# Patient Record
Sex: FEMALE | NOT HISPANIC OR LATINO | URBAN - METROPOLITAN AREA
[De-identification: names, ages, dates, MRNs, and addresses within clinical notes are randomized per-mention and may not be internally consistent; named-entity substitution may affect disease eponyms.]

---

## 2020-07-07 ENCOUNTER — OFFICE VISIT (OUTPATIENT)
Dept: URBAN - METROPOLITAN AREA CLINIC 113 | Facility: CLINIC | Age: 65
End: 2020-07-07

## 2020-07-25 ENCOUNTER — TELEPHONE ENCOUNTER (OUTPATIENT)
Dept: URBAN - METROPOLITAN AREA CLINIC 13 | Facility: CLINIC | Age: 65
End: 2020-07-25

## 2020-07-26 ENCOUNTER — TELEPHONE ENCOUNTER (OUTPATIENT)
Dept: URBAN - METROPOLITAN AREA CLINIC 13 | Facility: CLINIC | Age: 65
End: 2020-07-26

## 2020-07-26 RX ORDER — BETAMETHASONE VALERATE 1 MG/G
APP EXT AA BID FOR 14 DAYS CREAM TOPICAL
Qty: 30 | Refills: 0 | Status: ACTIVE | COMMUNITY
Start: 2019-08-07

## 2020-07-26 RX ORDER — PREDNISOLONE ACETATE 10 MG/ML
SUSPENSION/ DROPS OPHTHALMIC
Qty: 5 | Refills: 0 | Status: ACTIVE | COMMUNITY
Start: 2019-01-31

## 2020-07-26 RX ORDER — LEVOTHYROXINE SODIUM 0.1 MG/1
TAKE 1 TABLET BY MOUTH ONCE DAILY IN THE MORNING ON AN EMPTY STOMACH TABLET ORAL
Qty: 90 | Refills: 0 | Status: ACTIVE | COMMUNITY
Start: 2019-05-16

## 2020-07-26 RX ORDER — SODIUM FLUORIDE 5 MG/G
GEL, DENTIFRICE DENTAL
Qty: 56 | Refills: 0 | Status: ACTIVE | COMMUNITY
Start: 2019-09-04

## 2020-07-26 RX ORDER — CLINDAMYCIN HYDROCHLORIDE 150 MG/1
TAKE 1 CAPSULE EVERY 6 HOURS UNTIL GONE CAPSULE ORAL
Qty: 30 | Refills: 0 | Status: ACTIVE | COMMUNITY
Start: 2019-05-30

## 2020-07-26 RX ORDER — CLOTRIMAZOLE AND BETAMETHASONE DIPROPIONATE 10; .5 MG/G; MG/G
CREAM TOPICAL
Qty: 30 | Refills: 0 | Status: ACTIVE | COMMUNITY
Start: 2020-04-20

## 2020-07-26 RX ORDER — URSODIOL 250 MG/1
TABLET ORAL
Qty: 120 | Refills: 0 | Status: ACTIVE | COMMUNITY
Start: 2018-11-16

## 2020-07-26 RX ORDER — DUREZOL 0.5 MG/ML
EMULSION OPHTHALMIC
Qty: 5 | Refills: 0 | Status: ACTIVE | COMMUNITY
Start: 2019-04-04

## 2020-07-26 RX ORDER — SODIUM FLUORIDE 5 MG/G
BRUSH FOR 30 SECONDS SWISH FOR 30 SECONDS AND SPIT DO NOT RINSE GEL, DENTIFRICE DENTAL
Qty: 56 | Refills: 0 | Status: ACTIVE | COMMUNITY
Start: 2019-09-09

## 2020-07-26 RX ORDER — TRAMADOL HYDROCHLORIDE 50 MG/1
TK 1 T PO ONCE D PRN TABLET ORAL
Qty: 90 | Refills: 0 | Status: ACTIVE | COMMUNITY
Start: 2018-12-11

## 2020-07-26 RX ORDER — URSODIOL 500 MG/1
TAKE 1 TABLET TWICE DAILY TABLET ORAL
Qty: 180 | Refills: 3 | Status: ACTIVE | COMMUNITY
Start: 2019-04-17

## 2020-07-26 RX ORDER — SIMVASTATIN 10 MG/1
TAKE 1 TABLET DAILY TABLET, FILM COATED ORAL
Refills: 0 | Status: ACTIVE | COMMUNITY
Start: 2019-08-07

## 2020-07-26 RX ORDER — CLOTRIMAZOLE AND BETAMETHASONE DIPROPIONATE 10; .5 MG/G; MG/G
CREAM TOPICAL
Qty: 30 | Refills: 0 | Status: ACTIVE | COMMUNITY
Start: 2020-02-11

## 2020-07-26 RX ORDER — SULFAMETHOXAZOLE AND TRIMETHOPRIM 800; 160 MG/1; MG/1
TABLET ORAL
Qty: 14 | Refills: 0 | Status: ACTIVE | COMMUNITY
Start: 2020-02-07

## 2020-07-26 RX ORDER — MELOXICAM 15 MG/1
TAKE 1 TABLET DAILY TABLET ORAL
Refills: 0 | Status: ACTIVE | COMMUNITY
Start: 2019-08-07

## 2020-08-04 ENCOUNTER — WEB ENCOUNTER (OUTPATIENT)
Dept: URBAN - METROPOLITAN AREA CLINIC 113 | Facility: CLINIC | Age: 65
End: 2020-08-04

## 2020-08-28 ENCOUNTER — TELEPHONE ENCOUNTER (OUTPATIENT)
Dept: URBAN - METROPOLITAN AREA CLINIC 113 | Facility: CLINIC | Age: 65
End: 2020-08-28

## 2020-08-28 RX ORDER — HEPATITIS A AND HEPATITIS B (RECOMBINANT) VACCINE 720; 20 [IU]/ML; UG/ML
INJECT INJECTION, SUSPENSION INTRAMUSCULAR
Qty: 3 | Refills: 0 | OUTPATIENT
Start: 2020-08-28 | End: 2020-08-31

## 2020-09-24 ENCOUNTER — ERX REFILL RESPONSE (OUTPATIENT)
Dept: URBAN - METROPOLITAN AREA CLINIC 113 | Facility: CLINIC | Age: 65
End: 2020-09-24

## 2020-09-24 ENCOUNTER — TELEPHONE ENCOUNTER (OUTPATIENT)
Dept: URBAN - METROPOLITAN AREA CLINIC 113 | Facility: CLINIC | Age: 65
End: 2020-09-24

## 2020-09-24 RX ORDER — HEPATITIS A AND HEPATITIS B (RECOMBINANT) VACCINE 720; 20 [IU]/ML; UG/ML
AS DIRECTED INJECTION, SUSPENSION INTRAMUSCULAR
Qty: 3 | Refills: 0 | OUTPATIENT

## 2020-09-24 RX ORDER — HEPATITIS A AND HEPATITIS B (RECOMBINANT) VACCINE 720; 20 [IU]/ML; UG/ML
AS DIRECTED INJECTION, SUSPENSION INTRAMUSCULAR
Qty: 3 | Refills: 0 | OUTPATIENT
Start: 2020-09-24 | End: 2020-09-27

## 2020-09-24 RX ORDER — HEPATITIS A AND HEPATITIS B (RECOMBINANT) VACCINE 720; 20 [IU]/ML; UG/ML
PHARMACIST ADMINISTERED IMMUNIZATION, ADMINISTERED AT TIME OF DISPENSING AS  DIRECTED  NOW,  THEN  IN  1  MONTH,  THEN  IN  6  MONTHS INJECTION, SUSPENSION INTRAMUSCULAR
Qty: 3 EACH | Refills: 0 | OUTPATIENT

## 2020-10-21 ENCOUNTER — TELEPHONE ENCOUNTER (OUTPATIENT)
Dept: URBAN - METROPOLITAN AREA CLINIC 113 | Facility: CLINIC | Age: 65
End: 2020-10-21

## 2020-10-28 ENCOUNTER — OFFICE VISIT (OUTPATIENT)
Dept: URBAN - METROPOLITAN AREA CLINIC 113 | Facility: CLINIC | Age: 65
End: 2020-10-28

## 2020-11-09 ENCOUNTER — TELEPHONE ENCOUNTER (OUTPATIENT)
Dept: URBAN - METROPOLITAN AREA CLINIC 113 | Facility: CLINIC | Age: 65
End: 2020-11-09

## 2020-11-30 ENCOUNTER — OFFICE VISIT (OUTPATIENT)
Dept: URBAN - METROPOLITAN AREA CLINIC 113 | Facility: CLINIC | Age: 65
End: 2020-11-30
Payer: MEDICARE

## 2020-11-30 ENCOUNTER — WEB ENCOUNTER (OUTPATIENT)
Dept: URBAN - METROPOLITAN AREA CLINIC 113 | Facility: CLINIC | Age: 65
End: 2020-11-30

## 2020-11-30 VITALS
WEIGHT: 159 LBS | SYSTOLIC BLOOD PRESSURE: 157 MMHG | RESPIRATION RATE: 18 BRPM | HEIGHT: 63 IN | BODY MASS INDEX: 28.17 KG/M2 | HEART RATE: 79 BPM | TEMPERATURE: 98.4 F | DIASTOLIC BLOOD PRESSURE: 76 MMHG

## 2020-11-30 DIAGNOSIS — K74.3 PRIMARY BILIARY CHOLANGITIS: ICD-10-CM

## 2020-11-30 DIAGNOSIS — K76.0 NAFLD (NONALCOHOLIC FATTY LIVER DISEASE): ICD-10-CM

## 2020-11-30 DIAGNOSIS — K75.4 AUTOIMMUNE HEPATITIS: ICD-10-CM

## 2020-11-30 PROBLEM — 408335007: Status: ACTIVE | Noted: 2020-11-30

## 2020-11-30 PROCEDURE — G8427 DOCREV CUR MEDS BY ELIG CLIN: HCPCS | Performed by: INTERNAL MEDICINE

## 2020-11-30 PROCEDURE — G8417 CALC BMI ABV UP PARAM F/U: HCPCS | Performed by: INTERNAL MEDICINE

## 2020-11-30 PROCEDURE — 76700 US EXAM ABDOM COMPLETE: CPT | Performed by: INTERNAL MEDICINE

## 2020-11-30 PROCEDURE — 3017F COLORECTAL CA SCREEN DOC REV: CPT | Performed by: INTERNAL MEDICINE

## 2020-11-30 PROCEDURE — G9903 PT SCRN TBCO ID AS NON USER: HCPCS | Performed by: INTERNAL MEDICINE

## 2020-11-30 PROCEDURE — 99214 OFFICE O/P EST MOD 30 MIN: CPT | Performed by: INTERNAL MEDICINE

## 2020-11-30 RX ORDER — HEPATITIS A AND HEPATITIS B (RECOMBINANT) VACCINE 720; 20 [IU]/ML; UG/ML
PHARMACIST ADMINISTERED IMMUNIZATION, ADMINISTERED AT TIME OF DISPENSING AS  DIRECTED  NOW,  THEN  IN  1  MONTH,  THEN  IN  6  MONTHS INJECTION, SUSPENSION INTRAMUSCULAR
Qty: 3 EACH | Refills: 0 | Status: DISCONTINUED | COMMUNITY

## 2020-11-30 RX ORDER — SODIUM FLUORIDE 5 MG/G
GEL, DENTIFRICE DENTAL
Qty: 56 | Refills: 0 | Status: DISCONTINUED | COMMUNITY
Start: 2019-09-04

## 2020-11-30 RX ORDER — DUREZOL 0.5 MG/ML
EMULSION OPHTHALMIC
Qty: 5 | Refills: 0 | Status: DISCONTINUED | COMMUNITY
Start: 2019-04-04

## 2020-11-30 RX ORDER — BETAMETHASONE VALERATE 1 MG/G
APP EXT AA BID FOR 14 DAYS CREAM TOPICAL
Qty: 30 | Refills: 0 | Status: DISCONTINUED | COMMUNITY
Start: 2019-08-07

## 2020-11-30 RX ORDER — MELOXICAM 15 MG/1
TAKE 1 TABLET DAILY TABLET ORAL
Refills: 0 | Status: ACTIVE | COMMUNITY
Start: 2019-08-07

## 2020-11-30 RX ORDER — SULFAMETHOXAZOLE AND TRIMETHOPRIM 800; 160 MG/1; MG/1
TABLET ORAL
Qty: 14 | Refills: 0 | Status: DISCONTINUED | COMMUNITY
Start: 2020-02-07

## 2020-11-30 RX ORDER — URSODIOL 500 MG/1
TAKE 1 TABLET TWICE DAILY TABLET ORAL
Qty: 180 | Refills: 3 | Status: ACTIVE | COMMUNITY
Start: 2019-04-17

## 2020-11-30 RX ORDER — CLINDAMYCIN HYDROCHLORIDE 150 MG/1
TAKE 1 CAPSULE EVERY 6 HOURS UNTIL GONE CAPSULE ORAL
Qty: 30 | Refills: 0 | Status: DISCONTINUED | COMMUNITY
Start: 2019-05-30

## 2020-11-30 RX ORDER — TRAMADOL HYDROCHLORIDE 50 MG/1
TK 1 T PO ONCE D PRN TABLET ORAL
Qty: 90 | Refills: 0 | Status: DISCONTINUED | COMMUNITY
Start: 2018-12-11

## 2020-11-30 RX ORDER — PREDNISOLONE ACETATE 10 MG/ML
SUSPENSION/ DROPS OPHTHALMIC
Qty: 5 | Refills: 0 | Status: DISCONTINUED | COMMUNITY
Start: 2019-01-31

## 2020-11-30 RX ORDER — CLOTRIMAZOLE AND BETAMETHASONE DIPROPIONATE 10; .5 MG/G; MG/G
CREAM TOPICAL
Qty: 30 | Refills: 0 | Status: DISCONTINUED | COMMUNITY
Start: 2020-02-11

## 2020-11-30 RX ORDER — SIMVASTATIN 10 MG/1
TAKE 1 TABLET DAILY TABLET, FILM COATED ORAL
Refills: 0 | Status: ACTIVE | COMMUNITY
Start: 2019-08-07

## 2020-11-30 RX ORDER — LEVOTHYROXINE SODIUM 0.1 MG/1
TAKE 1 TABLET BY MOUTH ONCE DAILY IN THE MORNING ON AN EMPTY STOMACH TABLET ORAL
Qty: 90 | Refills: 0 | Status: ACTIVE | COMMUNITY
Start: 2019-05-16

## 2020-11-30 RX ORDER — URSODIOL 250 MG/1
1 TABLET TABLET ORAL ONCE A DAY
Refills: 0 | Status: DISCONTINUED | COMMUNITY
Start: 2018-11-16

## 2020-11-30 NOTE — HPI-TODAY'S VISIT:
Ms. DIAS is a 64 year old female with a history of lung cancer, hypothyroidism, and sleep apnea initially referred for evaluation of elevated liver enzymes.  She was last seen July 7th.  She is on Kar bid.   Labs are below from October.  She was diagnosed with PBC and NAFLD in the past.  She is no longer working so her activity has decreased.  Her weight has increased by about 10lbs.  She tries to walk for about 20 minutes a few times a week.  No alcohol use.  She denies any new medications.  She does use Meloxicam daily for arthritis. She did get the Hep A and B vaccine this year.  She denies any new GI complaints including abdominal pain, nausea, vomiting, jaundice, pruritus, fatigue or weight loss. Previous liver biopsy August 2018 did reveal chronic hepatitis grade 2 stage I, mild steatosis, no cholestasis, no iron storage. AMA was negative and ASMA was mildly positive.  She is compliant with Kar 500 mg twice daily. She has had two previously normal colonoscopies, in 2005 and 2015.   Labs 12/5/19 : AST 38, ALT 39, , T bili 0.3. Negative hepatitis A antibody IgM, hepatitis B surface antigen, hepatitis B core antibody IgM, hepatitis C antibody. Labs 10/26/2020 : BUN 11, creatinine 0.8, AST 37, ALT 40, alkaline phosphatase 133, T bili 0.3, albumin 4.7; WBC 7.1, hemoglobin 13.6, , platelets 453

## 2021-01-21 ENCOUNTER — LAB OUTSIDE AN ENCOUNTER (OUTPATIENT)
Dept: URBAN - METROPOLITAN AREA CLINIC 113 | Facility: CLINIC | Age: 66
End: 2021-01-21

## 2021-09-01 ENCOUNTER — ERX REFILL RESPONSE (OUTPATIENT)
Dept: URBAN - METROPOLITAN AREA CLINIC 113 | Facility: CLINIC | Age: 66
End: 2021-09-01

## 2021-09-01 RX ORDER — URSODIOL 500 MG/1
TAKE ONE TABLET BY MOUTH TWICE A DAY TABLET ORAL
Qty: 180 TABLET | Refills: 1 | OUTPATIENT

## 2021-09-01 RX ORDER — URSODIOL 500 MG/1
TAKE 1 TABLET TWICE DAILY TABLET ORAL
Qty: 180 | Refills: 3 | OUTPATIENT

## 2021-11-03 ENCOUNTER — WEB ENCOUNTER (OUTPATIENT)
Dept: URBAN - METROPOLITAN AREA CLINIC 113 | Facility: CLINIC | Age: 66
End: 2021-11-03

## 2021-11-10 LAB
A/G RATIO: 1.2
ALBUMIN: 4.6
ALKALINE PHOSPHATASE: 146
ALT (SGPT): 31
AST (SGOT): 32
BASO (ABSOLUTE): 0
BASOS: 1
BILIRUBIN, TOTAL: 0.5
BUN/CREATININE RATIO: 10
BUN: 6
CALCIUM: 10.3
CARBON DIOXIDE, TOTAL: 29
CHLORIDE: 102
CREATININE: 0.61
EGFR IF AFRICN AM: 108
EGFR IF NONAFRICN AM: 94
EOS (ABSOLUTE): 0.1
EOS: 2
GLOBULIN, TOTAL: 3.7
GLUCOSE: 96
HEMATOCRIT: 41.6
HEMATOLOGY COMMENTS:: (no result)
HEMOGLOBIN: 13.6
IMMATURE CELLS: (no result)
IMMATURE GRANS (ABS): 0
IMMATURE GRANULOCYTES: 0
LYMPHS (ABSOLUTE): 1.9
LYMPHS: 29
MCH: 31.3
MCHC: 32.7
MCV: 96
MONOCYTES(ABSOLUTE): 0.8
MONOCYTES: 12
NEUTROPHILS (ABSOLUTE): 3.7
NEUTROPHILS: 56
NRBC: (no result)
PLATELETS: 495
POTASSIUM: 5.3
PROTEIN, TOTAL: 8.3
RBC: 4.34
RDW: 12
SODIUM: 143
WBC: 6.5

## 2021-12-01 ENCOUNTER — OFFICE VISIT (OUTPATIENT)
Dept: URBAN - METROPOLITAN AREA CLINIC 113 | Facility: CLINIC | Age: 66
End: 2021-12-01
Payer: MEDICARE

## 2021-12-01 VITALS
RESPIRATION RATE: 20 BRPM | DIASTOLIC BLOOD PRESSURE: 78 MMHG | BODY MASS INDEX: 26.4 KG/M2 | SYSTOLIC BLOOD PRESSURE: 132 MMHG | HEART RATE: 77 BPM | HEIGHT: 63 IN | TEMPERATURE: 97.3 F | WEIGHT: 149 LBS

## 2021-12-01 DIAGNOSIS — K76.0 NAFLD (NONALCOHOLIC FATTY LIVER DISEASE): ICD-10-CM

## 2021-12-01 DIAGNOSIS — K74.3 PRIMARY BILIARY CHOLANGITIS: ICD-10-CM

## 2021-12-01 PROCEDURE — 99213 OFFICE O/P EST LOW 20 MIN: CPT | Performed by: INTERNAL MEDICINE

## 2021-12-01 RX ORDER — LEVOTHYROXINE SODIUM 0.11 MG/1
TAKE 1 TABLET BY MOUTH ONCE DAILY IN THE MORNING ON AN EMPTY STOMACH TABLET ORAL ONCE A DAY
Refills: 0 | Status: ACTIVE | COMMUNITY
Start: 2019-05-16

## 2021-12-01 RX ORDER — URSODIOL 500 MG/1
TAKE ONE TABLET BY MOUTH TWICE A DAY TABLET ORAL
Qty: 180 TABLET | Refills: 1 | Status: ACTIVE | COMMUNITY

## 2021-12-01 RX ORDER — MELOXICAM 15 MG/1
TAKE 1 TABLET DAILY TABLET ORAL
Refills: 0 | Status: ON HOLD | COMMUNITY
Start: 2019-08-07

## 2021-12-01 RX ORDER — SIMVASTATIN 10 MG/1
TAKE 1 TABLET DAILY TABLET, FILM COATED ORAL
Refills: 0 | Status: ACTIVE | COMMUNITY
Start: 2019-08-07

## 2021-12-01 NOTE — HPI-TODAY'S VISIT:
Ms. DIAS is a 66year old female with a history of lung cancer, hypothyroidism, and sleep apnea presenting for follow up of elevated liver enzymes.  She was diagnosed with PBC and NAFLD in the past. She was last seen 11/2020 with slight increase in liver enzymes. Repeat labs and RUQ Ultrasound with elastography were recommended. US 12/2020 noted mild adenomyomatosis/cholesterolosis of the gallbladder, no cholecystitis or cholelithiasis. Heterogeneous increased echogenicity throughout the liver parenchyma consistent with chronic liver disease and/or steatosis. No focal hepatic lesion, no fibrosis and shear wave velocity 0.82. Labs 12/2020 noted positive LIDIA 1:2560. Negative AMA.  ASMA was 24.6, which was equivocal.  AST 37, ALT 41, , TB 0.4, Alb 4.3. Recent labs 9/2021 noted AST 37 (mildly elevated), ALT 32,  (elevated), TB 0.4, Alb 4.5.  She is doing well without any GI complaints. She continues on Ursodiol 500mg twice daily. She denies any abdominal pain, nausea, vomiting, change in bowel habits, melena or hematochezia.   Previous liver biopsy August 2018 did reveal chronic hepatitis grade 2 stage I, mild steatosis, no cholestasis, no iron storage. findings may be consistent with early PBC, chronic viral hepatitis or drug-induced liver injury.   AMA was negative and ASMA was mildly positive.      Labs 12/5/19 : AST 38, ALT 39, , T bili 0.3. Negative hepatitis A antibody IgM, hepatitis B surface antigen, hepatitis B core antibody IgM, hepatitis C antibody. Labs 10/26/2020 : BUN 11, creatinine 0.8, AST 37, ALT 40, alkaline phosphatase 133, T bili 0.3, albumin 4.7; WBC 7.1, hemoglobin 13.6, , platelets 453   She has had two previously normal colonoscopies, in 2005 and 2015.

## 2021-12-15 ENCOUNTER — ERX REFILL RESPONSE (OUTPATIENT)
Dept: URBAN - METROPOLITAN AREA CLINIC 107 | Facility: CLINIC | Age: 66
End: 2021-12-15

## 2021-12-15 ENCOUNTER — TELEPHONE ENCOUNTER (OUTPATIENT)
Dept: URBAN - METROPOLITAN AREA CLINIC 23 | Facility: CLINIC | Age: 66
End: 2021-12-15

## 2021-12-15 RX ORDER — URSODIOL 500 MG/1
TAKE ONE TABLET BY MOUTH TWICE A DAY TABLET ORAL
Qty: 180 TABLET | Refills: 5 | OUTPATIENT

## 2021-12-15 RX ORDER — URSODIOL 500 MG/1
TAKE ONE TABLET BY MOUTH TWICE A DAY TABLET ORAL
Qty: 180 TABLET | Refills: 1 | OUTPATIENT

## 2021-12-15 RX ORDER — URSODIOL 500 MG/1
1 TABLET TABLET ORAL TWICE A DAY
Qty: 180 TABLET | Refills: 3 | OUTPATIENT

## 2022-06-13 ENCOUNTER — OFFICE VISIT (OUTPATIENT)
Dept: URBAN - METROPOLITAN AREA CLINIC 113 | Facility: CLINIC | Age: 67
End: 2022-06-13
Payer: MEDICARE

## 2022-06-13 VITALS
TEMPERATURE: 98.6 F | BODY MASS INDEX: 27.29 KG/M2 | SYSTOLIC BLOOD PRESSURE: 134 MMHG | HEIGHT: 63 IN | HEART RATE: 75 BPM | WEIGHT: 154 LBS | DIASTOLIC BLOOD PRESSURE: 75 MMHG

## 2022-06-13 DIAGNOSIS — K74.3 PRIMARY BILIARY CHOLANGITIS: ICD-10-CM

## 2022-06-13 DIAGNOSIS — K76.0 NAFLD (NONALCOHOLIC FATTY LIVER DISEASE): ICD-10-CM

## 2022-06-13 PROBLEM — 197315008: Status: ACTIVE | Noted: 2020-11-30

## 2022-06-13 PROBLEM — 31712002: Status: ACTIVE | Noted: 2020-10-21

## 2022-06-13 PROCEDURE — 99213 OFFICE O/P EST LOW 20 MIN: CPT | Performed by: INTERNAL MEDICINE

## 2022-06-13 RX ORDER — URSODIOL 500 MG/1
TAKE ONE TABLET BY MOUTH TWICE A DAY TABLET ORAL
Qty: 180 TABLET | Refills: 5 | Status: ACTIVE | COMMUNITY

## 2022-06-13 RX ORDER — URSODIOL 500 MG/1
TAKE ONE TABLET BY MOUTH TWICE A DAY TABLET ORAL
OUTPATIENT

## 2022-06-13 RX ORDER — HYDROCHLOROTHIAZIDE 25 MG/1
1 TABLET IN THE MORNING TABLET ORAL ONCE A DAY
Status: ACTIVE | COMMUNITY

## 2022-06-13 RX ORDER — SIMVASTATIN 10 MG/1
TAKE 1 TABLET DAILY TABLET, FILM COATED ORAL
Refills: 0 | Status: ACTIVE | COMMUNITY
Start: 2019-08-07

## 2022-06-13 RX ORDER — LEVOTHYROXINE SODIUM 0.11 MG/1
TAKE 1 TABLET BY MOUTH ONCE DAILY IN THE MORNING ON AN EMPTY STOMACH TABLET ORAL ONCE A DAY
Refills: 0 | Status: ACTIVE | COMMUNITY
Start: 2019-05-16

## 2022-06-13 NOTE — HPI-TODAY'S VISIT:
Ms. DIAS is a 66 year old female with a history of lung cancer, hyperlipidemia, hypothyroidism, and sleep apnea presenting for follow up of elevated liver enzymes and PBC.  She was diagnosed with PBC and NAFLD in the past.   US 12/2020 noted mild adenomyomatosis/cholesterolosis of the gallbladder, no cholecystitis or cholelithiasis. Heterogeneous increased echogenicity throughout the liver parenchyma consistent with chronic liver disease and/or steatosis. No focal hepatic lesion, no fibrosis and shear wave velocity 0.82. She is doing well without any GI complaints. She continues on Ursodiol 500mg twice daily. She denies any abdominal pain, nausea, vomiting, change in bowel habits, melena or hematochezia.   Previous liver biopsy August 2018 did reveal chronic hepatitis grade 2 stage I, mild steatosis, no cholestasis, no iron storage. findings may be consistent with early PBC, chronic viral hepatitis or drug-induced liver injury.  AMA was negative and ASMA was mildly positive.      Labs  3/17/2022 BUN 8, creatinine 0.6, AST 36, ALT 37, alk phos 133, T bili 0.7, albumin 4.6; WBC 6.9, hemoglobin 12.8, MCV 99, platelets 455 12/2020 noted positive LIDIA 1:2560. Negative AMA.  ASMA was 24.6, which was equivocal.  AST 37, ALT 41, , TB 0.4, Alb 4.3 12/5/19 : AST 38, ALT 39, , T bili 0.3. Negative hepatitis A antibody IgM, hepatitis B surface antigen, hepatitis B core antibody IgM, hepatitis C antibody.  She has had two previously normal colonoscopies, in 2005 and 2015.

## 2023-01-12 ENCOUNTER — ERX REFILL RESPONSE (OUTPATIENT)
Dept: URBAN - METROPOLITAN AREA CLINIC 107 | Facility: CLINIC | Age: 68
End: 2023-01-12

## 2023-01-12 RX ORDER — URSODIOL 500 MG/1
TAKE ONE TABLET BY MOUTH TWICE A DAY TABLET ORAL
Qty: 180 TABLET | Refills: 6 | OUTPATIENT

## 2023-01-12 RX ORDER — URSODIOL 500 MG/1
TAKE ONE TABLET BY MOUTH TWICE A DAY TABLET ORAL
OUTPATIENT

## 2023-07-24 ENCOUNTER — DASHBOARD ENCOUNTERS (OUTPATIENT)
Age: 68
End: 2023-07-24

## 2023-07-24 ENCOUNTER — OFFICE VISIT (OUTPATIENT)
Dept: URBAN - METROPOLITAN AREA CLINIC 113 | Facility: CLINIC | Age: 68
End: 2023-07-24
Payer: MEDICARE

## 2023-07-24 VITALS
WEIGHT: 150.8 LBS | DIASTOLIC BLOOD PRESSURE: 71 MMHG | TEMPERATURE: 97.7 F | RESPIRATION RATE: 18 BRPM | HEIGHT: 63 IN | HEART RATE: 73 BPM | BODY MASS INDEX: 26.72 KG/M2 | SYSTOLIC BLOOD PRESSURE: 138 MMHG

## 2023-07-24 DIAGNOSIS — K74.3 PRIMARY BILIARY CHOLANGITIS: ICD-10-CM

## 2023-07-24 DIAGNOSIS — K76.0 NAFLD (NONALCOHOLIC FATTY LIVER DISEASE): ICD-10-CM

## 2023-07-24 PROCEDURE — 99213 OFFICE O/P EST LOW 20 MIN: CPT | Performed by: INTERNAL MEDICINE

## 2023-07-24 RX ORDER — LEVOTHYROXINE SODIUM 0.11 MG/1
TAKE 1 TABLET BY MOUTH ONCE DAILY IN THE MORNING ON AN EMPTY STOMACH TABLET ORAL ONCE A DAY
Refills: 0 | Status: ACTIVE | COMMUNITY
Start: 2019-05-16

## 2023-07-24 RX ORDER — URSODIOL 500 MG/1
TAKE ONE TABLET BY MOUTH TWICE A DAY TABLET ORAL
Qty: 180 TABLET | Refills: 6 | Status: ACTIVE | COMMUNITY

## 2023-07-24 RX ORDER — SIMVASTATIN 10 MG/1
TAKE 1 TABLET DAILY TABLET, FILM COATED ORAL
Refills: 0 | Status: ACTIVE | COMMUNITY
Start: 2019-08-07

## 2023-07-24 RX ORDER — URSODIOL 500 MG/1
TAKE ONE TABLET BY MOUTH TWICE A DAY TABLET ORAL
OUTPATIENT

## 2023-07-24 RX ORDER — HYDROCHLOROTHIAZIDE 12.5 MG/1
1 CAPSULE IN THE MORNING CAPSULE, GELATIN COATED ORAL ONCE A DAY
Status: ACTIVE | COMMUNITY

## 2023-07-24 NOTE — HPI-TODAY'S VISIT:
Ms. DIAS is a 67 year old female with a history of lung cancer, hyperlipidemia, hypothyroidism, and sleep apnea presenting for follow up of elevated liver enzymes and PBC.  She was diagnosed with PBC and NAFLD in the past.   US 12/2020 noted mild adenomyomatosis/cholesterolosis of the gallbladder, no cholecystitis or cholelithiasis. Heterogeneous increased echogenicity throughout the liver parenchyma consistent with chronic liver disease and/or steatosis. No focal hepatic lesion, no fibrosis and shear wave velocity 0.82. She is doing well without any GI complaints. She continues on Ursodiol 500mg twice daily. She denies any abdominal pain, nausea, vomiting, change in bowel habits, melena or hematochezia.  She had labs with her PCP 6 months ago and was told they were within normal limits.  She is due for labs with her PCP in August and has labs this month with her endocrinologist.  She has been intentionally losing weight with mainly diet.  She is not exercising very much.  She denies nausea, vomiting, fatigue, abdominal pain or change in bowel habits.  Previous liver biopsy August 2018 did reveal chronic hepatitis grade 2 stage I, mild steatosis, no cholestasis, no iron storage. findings may be consistent with early PBC, chronic viral hepatitis or drug-induced liver injury.  AMA was negative and ASMA was mildly positive.      Labs  3/17/2022 BUN 8, creatinine 0.6, AST 36, ALT 37, alk phos 133, T bili 0.7, albumin 4.6; WBC 6.9, hemoglobin 12.8, MCV 99, platelets 455 12/2020 noted positive LIDIA 1:2560. Negative AMA.  ASMA was 24.6, which was equivocal.  AST 37, ALT 41, , TB 0.4, Alb 4.3 12/5/19 : AST 38, ALT 39, , T bili 0.3. Negative hepatitis A antibody IgM, hepatitis B surface antigen, hepatitis B core antibody IgM, hepatitis C antibody.  She has had two previously normal colonoscopies, in 2005 and 2015.
4-6 mos

## 2024-01-26 ENCOUNTER — ERX REFILL RESPONSE (OUTPATIENT)
Dept: URBAN - METROPOLITAN AREA CLINIC 107 | Facility: CLINIC | Age: 69
End: 2024-01-26

## 2024-01-26 RX ORDER — URSODIOL 500 MG/1
TAKE ONE TABLET BY MOUTH TWICE A DAY TABLET ORAL TWICE A DAY
Qty: 180 TABLET | Refills: 5 | OUTPATIENT

## 2024-01-26 RX ORDER — URSODIOL 500 MG/1
TAKE ONE TABLET BY MOUTH TWICE A DAY TABLET ORAL
OUTPATIENT